# Patient Record
(demographics unavailable — no encounter records)

---

## 2024-10-07 NOTE — PAST MEDICAL HISTORY
[Postmenopausal] : The patient is postmenopausal [Menopause Age____] : age at menopause was [unfilled] [Approximately ___] : the LMP was approximately [unfilled]

## 2024-10-07 NOTE — REASON FOR VISIT
[Follow - Up] : a follow-up visit [Hypothyroidism] : hypothyroidism [Osteoporosis] : osteoporosis [Spouse] : spouse

## 2024-10-08 NOTE — PROCEDURE
[FreeTextEntry1] : Thyroid ultrasound August 7, 2024 Right 3 mm X 4 mm X 5 mm nodule only.  Exam Date: December 28, 2023 compared to 2022 prior test showed rapid bone loss Lumbar spine 1-4   BMD: Not performed Total hip      BMD: 0.760 g/cm2 T score -1.5 WHO Classification osteopenia no significant change Femoral neck    BMD: 0.657 g/cm2 T score -1.7 WHO Classification osteopenia +7.0%, stable versus 2021 Proximal wrist    BMD: 0.514 g/cm2 T score -3.0 WHO Classification osteopenia no significant change  Bone Density 12/7/2022 Indication vs 2021 Prior test showed bone loss Spine : not performed  Total Hip -1.4 osteopenia , no significant change  Femoral Neck -2.1 osteopenia , -7.1% Proximal Radius -3.2 osteoporosis , no significant change   Bone mineral density: 11/16/2021  Indication: vs .2019 Spine: not performed Total hip: -1.4 osteopenia, no significant change Femoral neck: -1.7 osteopenia, no significant change Proximal radius: -3.2 osteoporosis, -7.2%  Bone mineral density: 11/18/2019  Indication: vs. 2017 Spine: not performed Total hip: -1.2 osteopenia, no significant change Femoral neck: -1.8 osteopenia, no significant change Proximal radius: -2.6 osteoporosis, no significant change  Bone mineral density 11/17/17 indication: prior study showed rapid bone loss  spine n/a total hip -1.3 osteopenia, no significant change  femoral neck -1.8 osteopenia, no significant change  proximal radius -2.6 osteoporosis, no significant change   Bone mineral density of November 16, 2016 Two-year study Spine not performed Total hip -1.3, osteopenia, -4.4% Femoral neck -1.8, osteopenia, -5.1% Proximal radius -2.7, osteoporosis, -3.3% not statistically significant  bone mineral density performed October 20, 2014 Spine falsely elevated Total hip  1.0, normal, no significant change versus 2012 Femoral neck -1.5, osteopenia, no significant change versus 2012 Proximal radius -2.4, osteopenia, +4.2% versus 2012, +8.9% versus 2011

## 2024-10-08 NOTE — ASSESSMENT
[Bisphosphonate Therapy] : Risks and benefits of bisphosphonate therapy were  discussed with the patient including gastroesophageal irritation, osteonecrosis of the jaw, and atypical femur fractures, and acute phase reaction [FreeTextEntry1] : 74 y/o female returns for a follow-up visit for hypercalcemia, osteoporosis and hypothyroidism.  H/o primary hyperparathyroidism. S/p parathyroidectomy 9/11 Dr. James Benson.  Labs: August 2024  Creatinine: 0.6 Calcium: 11.0 Alk phos: 78  Albuin: 4.5 TSH: 2.68 Differential diagnosis of hypercalcemia includes recurrence of hyperparathyroidism versus laboratory error versus other causes such as vitamin D excess. BMD 10/2014 showed significant improvement in cortical bone at proximal radius consistent with good response to previous parathyroidectomy. BMD 11/2016 showed declining in hip regions although still consistent with osteopenia, worsening osteoporosis in prox. radius. BMD 11/2017 indicates stable osteopenia in the hips and stable osteoporosis in proximal radius. Pt is currently not on any osteoporosis rx at this time. BMD results reviewed w/pt. BMD 11/2019 indicates stable osteopenia in hips and stable osteoporosis in proximal radius. BMD results reviewed w/pt.  BMD 11/2021 indicates stable osteopenia in hips but worsened low osteoporosis in proximal radius. BMD results reviewed w/pt. BMD 12/2022 indicates stable osteopenia in total hip, worsened osteoporosis in fem neck, stable osteoporosis in prox. radius. BMD results reviewed w/pt. BMD 12/2023 indicates stable osteopenia in total hip, improved osteopenia in fem neck, stable osteoporosis in prox. radius. BMD results reviewed w/pt.   The patient has completed dental implants. Options were discussed in great detail.   Risks and benefits of bisphosphonate therapy were discussed with the patient including gastroesophageal irritation, osteonecrosis of the jaw, and atypical femur fractures, and acute phase reaction. Patient elects to begin alendronate 70 mg weekly. Pt did not begin Alendronate due to upcoming dental work and prevention of ONJ.  Hx of subacute thyroiditis 2009, clinically and chemically euthyroid since. Had elevated TSH in past, repeat normal. Current physical examination shows no significant goiter.  Ultrasound shows a trivial subcentimeter right nodule.  This is too small to require biopsy.    Pt had an elevated calcium in recent blood work. Pt will repeat bloodwork to see if there is an indication of hyperparathyroidism or hypercalcemia.   F/u in 6 months

## 2024-10-08 NOTE — HISTORY OF PRESENT ILLNESS
[FreeTextEntry1] :  Pt returns for a follow-up visit for osteoporosis. Pt had bloodwork done which indicated elevated calcium. No major surgeries, hospitalizations, fractures or changes in medication. Up to date with dentist. No major dental work planned. Osteoporosis was prescribed August 2024 but patient has not started yet.  BMD 10/2014 showed significant improvement in cortical bone at proximal radius consistent with good response to previous parathyroidectomy. BMD 11/2016 showed declining in hip regions although still consistent with osteopenia, worsening osteoporosis in prox. radius. BMD 11/2017 indicates stable osteopenia in the hips and stable osteoporosis in proximal radius BMD 12/2023 indicates stable osteopenia in total hip, improved osteopenia in fem neck, stable osteoporosis in prox. radius. BMD results reviewed w/pt.   The patient has completed dental implants. Options were discussed in great detail.   H/o primary hyperparathyroidism. S/p parathyroidectomy 9/11 Dr. James Benson.  parathyroid surgery was successful with normalization of calcium and parathyroid levels.Hx of subacute thyroiditis 2009, clinically and chemically euthyroid since. Had elevated TSH in past, repeat normal.   PMHx: Had a hysterectomy for uterine fibroids age 45. Saw ophthalmology for early macular degeneration.

## 2024-10-08 NOTE — PHYSICAL EXAM
[Alert] : alert [Well Nourished] : well nourished [No Acute Distress] : no acute distress [Well Developed] : well developed [Normal Sclera/Conjunctiva] : normal sclera/conjunctiva [EOMI] : extra ocular movement intact [No Proptosis] : no proptosis [Thyroid Not Enlarged] : the thyroid was not enlarged [No Thyroid Nodules] : no palpable thyroid nodules [Well Healed Scar] : well healed scar [Clear to Auscultation] : lungs were clear to auscultation bilaterally [Normal S1, S2] : normal S1 and S2 [Normal Rate] : heart rate was normal [Regular Rhythm] : with a regular rhythm [No Edema] : no peripheral edema [Normal Bowel Sounds] : normal bowel sounds [Not Tender] : non-tender [Not Distended] : not distended [Soft] : abdomen soft [Normal Anterior Cervical Nodes] : no anterior cervical lymphadenopathy [No Spinal Tenderness] : no spinal tenderness [Scoliosis] : scoliosis present [No Stigmata of Cushings Syndrome] : no stigmata of Cushings Syndrome [Normal Gait] : normal gait [Normal Reflexes] : deep tendon reflexes were 2+ and symmetric [No Tremors] : no tremors [Oriented x3] : oriented to person, place, and time [de-identified] : Thyroid scar [de-identified] : 2/6 systolic murmur  [de-identified] : mild scoliosis

## 2024-10-08 NOTE — END OF VISIT
[FreeTextEntry3] :  This note was written by Krista Greene on (October 07, 2024) acting as a medical scribe for Dr. Barakat This note was authored by the medical scribe for me. I have reviewed, edited, and revised the note as needed. I am in agreement with the exam findings, imaging findings, and treatment plan.  Cleveland Barakat MD

## 2024-10-08 NOTE — PHYSICAL EXAM
[Alert] : alert [Well Nourished] : well nourished [No Acute Distress] : no acute distress [Well Developed] : well developed [Normal Sclera/Conjunctiva] : normal sclera/conjunctiva [EOMI] : extra ocular movement intact [No Proptosis] : no proptosis [Thyroid Not Enlarged] : the thyroid was not enlarged [No Thyroid Nodules] : no palpable thyroid nodules [Well Healed Scar] : well healed scar [Clear to Auscultation] : lungs were clear to auscultation bilaterally [Normal S1, S2] : normal S1 and S2 [Normal Rate] : heart rate was normal [Regular Rhythm] : with a regular rhythm [No Edema] : no peripheral edema [Normal Bowel Sounds] : normal bowel sounds [Not Tender] : non-tender [Not Distended] : not distended [Soft] : abdomen soft [Normal Anterior Cervical Nodes] : no anterior cervical lymphadenopathy [No Spinal Tenderness] : no spinal tenderness [Scoliosis] : scoliosis present [No Stigmata of Cushings Syndrome] : no stigmata of Cushings Syndrome [Normal Gait] : normal gait [Normal Reflexes] : deep tendon reflexes were 2+ and symmetric [No Tremors] : no tremors [Oriented x3] : oriented to person, place, and time [de-identified] : Thyroid scar [de-identified] : mild scoliosis [de-identified] : 2/6 systolic murmur

## 2025-06-04 NOTE — PROCEDURE
[FreeTextEntry1] : Thyroid ultrasound August 7, 2024 Right 3 mm X 4 mm X 5 mm nodule only.  Exam Date: December 28, 2023, compared to 2022 prior test showed rapid bone loss Lumbar spine 1-4   BMD: Not performed Total hip BMD: 0.760 g/cm2 T score -1.5 WHO Classification osteopenia no significant change Femoral neck BMD: 0.657 g/cm2 T score -1.7 WHO Classification osteopenia +7.0%, stable versus 2021 Proximal wrist BMD: 0.514 g/cm2 T score -3.0 WHO Classification osteopenia no significant change  Bone Density 12/7/2022 Indication vs 2021 Prior test showed bone loss Spine : not performed  Total Hip -1.4 osteopenia , no significant change  Femoral Neck -2.1 osteopenia , -7.1% Proximal Radius -3.2 osteoporosis , no significant change   Bone mineral density: 11/16/2021  Indication: vs .2019 Spine: not performed Total hip: -1.4 osteopenia, no significant change Femoral neck: -1.7 osteopenia, no significant change Proximal radius: -3.2 osteoporosis, -7.2%  Bone mineral density: 11/18/2019  Indication: vs. 2017 Spine: not performed Total hip: -1.2 osteopenia, no significant change Femoral neck: -1.8 osteopenia, no significant change Proximal radius: -2.6 osteoporosis, no significant change  Bone mineral density 11/17/17 indication: prior study showed rapid bone loss  spine n/a total hip -1.3 osteopenia, no significant change  femoral neck -1.8 osteopenia, no significant change  proximal radius -2.6 osteoporosis, no significant change   Bone mineral density of November 16, 2016 Two-year study Spine not performed Total hip -1.3, osteopenia, -4.4% Femoral neck -1.8, osteopenia, -5.1% Proximal radius -2.7, osteoporosis, -3.3% not statistically significant  bone mineral density performed October 20, 2014 Spine falsely elevated Total hip  1.0, normal, no significant change versus 2012 Femoral neck -1.5, osteopenia, no significant change versus 2012 Proximal radius -2.4, osteopenia, +4.2% versus 2012, +8.9% versus 2011

## 2025-06-04 NOTE — PHYSICAL EXAM
[Alert] : alert [Well Nourished] : well nourished [No Acute Distress] : no acute distress [Well Developed] : well developed [Normal Sclera/Conjunctiva] : normal sclera/conjunctiva [EOMI] : extra ocular movement intact [No Proptosis] : no proptosis [Thyroid Not Enlarged] : the thyroid was not enlarged [No Thyroid Nodules] : no palpable thyroid nodules [Well Healed Scar] : well healed scar [Clear to Auscultation] : lungs were clear to auscultation bilaterally [Normal S1, S2] : normal S1 and S2 [Normal Rate] : heart rate was normal [Regular Rhythm] : with a regular rhythm [No Edema] : no peripheral edema [Normal Bowel Sounds] : normal bowel sounds [Not Tender] : non-tender [Not Distended] : not distended [Soft] : abdomen soft [Normal Anterior Cervical Nodes] : no anterior cervical lymphadenopathy [No Spinal Tenderness] : no spinal tenderness [Scoliosis] : scoliosis present [No Stigmata of Cushings Syndrome] : no stigmata of Cushings Syndrome [Normal Gait] : normal gait [Normal Reflexes] : deep tendon reflexes were 2+ and symmetric [No Tremors] : no tremors [Oriented x3] : oriented to person, place, and time [de-identified] : Thyroid scar [de-identified] : 2/6 systolic murmur  [de-identified] : mild scoliosis

## 2025-06-04 NOTE — ASSESSMENT
[Bisphosphonate Therapy] : Risks and benefits of bisphosphonate therapy were  discussed with the patient including gastroesophageal irritation, osteonecrosis of the jaw, and atypical femur fractures, and acute phase reaction [FreeTextEntry1] : 75 y/o female returns for a follow-up visit for hypercalcemia, osteoporosis and hypothyroidism.   Differential diagnosis of hypercalcemia includes recurrence of hyperparathyroidism versus laboratory error versus other causes such as vitamin D excess. BMD 10/2014 showed significant improvement in cortical bone at proximal radius consistent with good response to previous parathyroidectomy. BMD 11/2016 showed declining in hip regions although still consistent with osteopenia, worsening osteoporosis in prox. radius. BMD 11/2017 indicates stable osteopenia in the hips and stable osteoporosis in proximal radius. Pt was currently not on any osteoporosis rx at this time. BMD 11/2019 indicates stable osteopenia in hips and stable osteoporosis in proximal radius. BMD 11/2021 indicates stable osteopenia in hips but worsened low osteoporosis in proximal radius. BMD 12/2022 indicates stable osteopenia in total hip, worsened osteoporosis in fem neck, stable osteoporosis in prox. radius. BMD 12/2023 indicates stable osteopenia in total hip, improved osteopenia in fem neck, stable osteoporosis in prox. radius.   Patient elects to begin alendronate 70 mg weekly 08/2024 but did not start due to dental work and prevention of ONJ. The pt. completed dental implants and began Alendronate 03/2025, took correctly but reports muscle pain after taking medication. I recommended transitioning to Risedronate.  All questions were answered. Rx information handout provided. The patient understands and elects to start Risedronate.  Hx of subacute thyroiditis 2009, clinically and chemically euthyroid since. Had elevated TSH in past, repeat normal. Current physical examination shows no significant goiter.  Ultrasound shows a trivial subcentimeter right nodule.  This is too small to require biopsy.  H/o primary hyperparathyroidism. S/p parathyroidectomy 9/11 Dr. James Benson.  H/o elevated calcium in prior blood work. Repeat Calcium 10.1 (10/2024)  Labs ordered today F/u in 6 months and repeat BMD

## 2025-06-04 NOTE — HISTORY OF PRESENT ILLNESS
[FreeTextEntry1] : Pt returns for a follow-up visit for osteoporosis. Pt. began Alendronate 03/2025, took correctly but reports muscle pain after taking medication. No interval changes. No major surgeries, hospitalizations, fractures or changes in medication. Up to date with dentist. No major dental work planned.  BMD 10/2014 showed significant improvement in cortical bone at proximal radius consistent with good response to previous parathyroidectomy. BMD 11/2016 showed declining in hip regions although still consistent with osteopenia, worsening osteoporosis in prox. radius. BMD 11/2017 indicates stable osteopenia in the hips and stable osteoporosis in proximal radius BMD 12/2023 indicates stable osteopenia in total hip, improved osteopenia in fem neck, stable osteoporosis in prox. radius.  Alendronate was prescribed August 2024, but patient did not start due to dental work.  The pt. completed dental implants and began Alendronate 03/2025, took correctly but reports muscle pain after taking medication.  H/o primary hyperparathyroidism. S/p parathyroidectomy 9/11 Dr. James Benson. Parathyroid surgery was successful with normalization of calcium and parathyroid levels. Hx of subacute thyroiditis 2009, clinically and chemically euthyroid since. Had elevated TSH in past, repeat normal.   PMHx: Had a hysterectomy for uterine fibroids age 45. Saw ophthalmology for early macular degeneration. [Alendronate (Fosomax)] : Alendronate

## 2025-06-04 NOTE — END OF VISIT
[FreeTextEntry3] : This note was written by Margarette Morrow on (June 4, 2025) acting as a medical scribe for Dr. Barakat. This note was authored by the medical scribe for me. I have reviewed, edited, and revised the note as needed. I am in agreement with the exam findings, imaging findings, and treatment plan. Cleveland Barakat MD

## 2025-06-04 NOTE — PHYSICAL EXAM
[Alert] : alert [Well Nourished] : well nourished [No Acute Distress] : no acute distress [Well Developed] : well developed [Normal Sclera/Conjunctiva] : normal sclera/conjunctiva [EOMI] : extra ocular movement intact [No Proptosis] : no proptosis [Thyroid Not Enlarged] : the thyroid was not enlarged [No Thyroid Nodules] : no palpable thyroid nodules [Well Healed Scar] : well healed scar [Clear to Auscultation] : lungs were clear to auscultation bilaterally [Normal S1, S2] : normal S1 and S2 [Normal Rate] : heart rate was normal [Regular Rhythm] : with a regular rhythm [No Edema] : no peripheral edema [Normal Bowel Sounds] : normal bowel sounds [Not Tender] : non-tender [Not Distended] : not distended [Soft] : abdomen soft [Normal Anterior Cervical Nodes] : no anterior cervical lymphadenopathy [No Spinal Tenderness] : no spinal tenderness [Scoliosis] : scoliosis present [No Stigmata of Cushings Syndrome] : no stigmata of Cushings Syndrome [Normal Gait] : normal gait [Normal Reflexes] : deep tendon reflexes were 2+ and symmetric [No Tremors] : no tremors [Oriented x3] : oriented to person, place, and time [de-identified] : Thyroid scar [de-identified] : 2/6 systolic murmur  [de-identified] : mild scoliosis

## 2025-06-06 NOTE — HISTORY OF PRESENT ILLNESS
[FreeTextEntry1] :  74 year old presents for  postmenopausal female presents for annual visit. She is doing well and has no complaints.  OBH: NSVDx2 (, ) GYNH: fibroids PSH: xlap STORMY, parathyroidectomy, shoulder surgery FH: Breast CA (Mauntsx2)  [Mammogramdate] : 11/2024 [BreastSonogramDate] : 11/2024 [PapSmeardate] : 5/23 [BoneDensityDate] : 12/23

## 2025-06-06 NOTE — PLAN
[FreeTextEntry1] : 75 yo, annual visit, stable  HCM - pap done today - vit D/exercise/calcium/BMD - dexa utd - rx breast mammo/sono - colon screening reviewed - f/u PCP for annual and appropriate immunizations - rto 1 year  hx of ovarian cysts - yearly sonos (Rx given)

## 2025-06-06 NOTE — END OF VISIT
[FreeTextEntry3] : I, Asmita Bradshaw, acted as a scribe on behalf of Dr. Juan Manuel Bradley M.D. on 06/04/2025.   All medical entries made by the scribe were at my Dr. Juan Manuel Bradley M.D. direction and personally dictated by me on 06/04/2025. I have reviewed the chart and agree that the record accurately reflects my personal performance of the history, physical exam, assessment and plan. I have also personally directed, reviewed, and agreed with the chart.

## 2025-06-06 NOTE — PHYSICAL EXAM
[Appropriately responsive] : appropriately responsive [Alert] : alert [No Acute Distress] : no acute distress [No Lymphadenopathy] : no lymphadenopathy [Soft] : soft [Non-tender] : non-tender [Non-distended] : non-distended [No HSM] : No HSM [No Lesions] : no lesions [No Mass] : no mass [Oriented x3] : oriented x3 [Examination Of The Breasts] : a normal appearance [No Masses] : no breast masses were palpable [Labia Majora] : normal [Labia Minora] : normal [Normal] : normal [Absent] : absent [Uterine Adnexae] : normal [MA] : MA [FreeTextEntry2] : Chuyita Qureshi [FreeTextEntry3] : no thyromegaly

## 2025-06-06 NOTE — PLAN
[FreeTextEntry1] : 73 yo, annual visit, stable  HCM - pap done today - vit D/exercise/calcium/BMD - dexa utd - rx breast mammo/sono - colon screening reviewed - f/u PCP for annual and appropriate immunizations - rto 1 year  hx of ovarian cysts - yearly sonos (Rx given)

## 2025-06-06 NOTE — END OF VISIT
[FreeTextEntry3] : I, Asmita Bradshaw, acted as a scribe on behalf of Dr. Juan Manuel Bradley M.D. on 06/04/2025.   All medical entries made by the scribe were at my Dr. Juan Manuel Bradlye M.D. direction and personally dictated by me on 06/04/2025. I have reviewed the chart and agree that the record accurately reflects my personal performance of the history, physical exam, assessment and plan. I have also personally directed, reviewed, and agreed with the chart.